# Patient Record
Sex: FEMALE | Race: BLACK OR AFRICAN AMERICAN | NOT HISPANIC OR LATINO
[De-identification: names, ages, dates, MRNs, and addresses within clinical notes are randomized per-mention and may not be internally consistent; named-entity substitution may affect disease eponyms.]

---

## 2017-06-21 ENCOUNTER — APPOINTMENT (OUTPATIENT)
Dept: ORTHOPEDIC SURGERY | Facility: CLINIC | Age: 52
End: 2017-06-21

## 2017-06-21 VITALS — BODY MASS INDEX: 39.37 KG/M2 | HEIGHT: 66 IN | WEIGHT: 245 LBS

## 2017-06-21 DIAGNOSIS — M48.07 SPINAL STENOSIS, LUMBOSACRAL REGION: ICD-10-CM

## 2017-06-21 DIAGNOSIS — M54.16 RADICULOPATHY, LUMBAR REGION: ICD-10-CM

## 2017-06-21 DIAGNOSIS — M43.16 SPONDYLOLISTHESIS, LUMBAR REGION: ICD-10-CM

## 2023-02-27 ENCOUNTER — EMERGENCY (EMERGENCY)
Facility: HOSPITAL | Age: 58
LOS: 1 days | Discharge: LEFT BEFORE DISCHARGE | End: 2023-02-27
Attending: EMERGENCY MEDICINE | Admitting: EMERGENCY MEDICINE
Payer: MEDICAID

## 2023-02-27 VITALS
DIASTOLIC BLOOD PRESSURE: 80 MMHG | SYSTOLIC BLOOD PRESSURE: 177 MMHG | TEMPERATURE: 99 F | OXYGEN SATURATION: 99 % | RESPIRATION RATE: 18 BRPM | HEART RATE: 72 BPM

## 2023-02-27 PROCEDURE — 71046 X-RAY EXAM CHEST 2 VIEWS: CPT | Mod: 26

## 2023-02-27 PROCEDURE — 99284 EMERGENCY DEPT VISIT MOD MDM: CPT | Mod: GC

## 2023-02-27 RX ORDER — ACETAMINOPHEN 500 MG
975 TABLET ORAL ONCE
Refills: 0 | Status: COMPLETED | OUTPATIENT
Start: 2023-02-27 | End: 2023-02-27

## 2023-02-27 RX ORDER — FAMOTIDINE 10 MG/ML
20 INJECTION INTRAVENOUS ONCE
Refills: 0 | Status: COMPLETED | OUTPATIENT
Start: 2023-02-27 | End: 2023-02-27

## 2023-02-27 RX ORDER — CYCLOBENZAPRINE HYDROCHLORIDE 10 MG/1
10 TABLET, FILM COATED ORAL ONCE
Refills: 0 | Status: COMPLETED | OUTPATIENT
Start: 2023-02-27 | End: 2023-02-27

## 2023-02-27 RX ORDER — CYCLOBENZAPRINE HYDROCHLORIDE 10 MG/1
5 TABLET, FILM COATED ORAL ONCE
Refills: 0 | Status: DISCONTINUED | OUTPATIENT
Start: 2023-02-27 | End: 2023-02-27

## 2023-02-27 NOTE — ED ADULT TRIAGE NOTE - CHIEF COMPLAINT QUOTE
Pt c/o L sided neck pain, L sided chest pain, sudden onset 2 hours ago. Also endorsing nausea and vomiting prior to arrival. Took 2 ASA at home. PMHx HTN

## 2023-02-27 NOTE — ED PROVIDER NOTE - NSFOLLOWUPCLINICS_GEN_ALL_ED_FT
[facility]  Cardiology  [address] [place] [id number]  Phone: [phone]  Fax:     [facility]  Cardiology  [address] [facility], [address]  [place] [address]  Phone: [phone]  Fax:

## 2023-02-27 NOTE — ED PROVIDER NOTE - CLINICAL SUMMARY MEDICAL DECISION MAKING FREE TEXT BOX
58-year-old female presenting to the ED with chest pain and neck pain started a few hours ago while sitting on the couch, pleuritic, worse with movement, did have 1 episode of nausea with nonbloody vomiting earlier, has not felt nauseous since then though has been belching, no other symptoms or concerns. On exam patient is well-appearing, unlabored breathing, neck supple with no swelling tenderness or limits range of motion, does have reproducible tenderness to left trapezius muscular region as well as left upper chest region, no crepitus, has equal chest rise, lungs are clear, abdomen is soft and nontender, no edema, extremities are warm and well-perfused with equal pulses bilaterally.  Patient has not had cardiac work-up in the past,.  Trope okay heart score is low, Wells score is low, will screen with dimer for PE given low risk profile. Plan for labs, chest x-ray, EKG, telemetry monitoring, already took aspirin, will administer additional symptomatic treatment, evaluate for differential including but not limited to electrolyte disturbances, organ dysfunction, ACS, pneumothorax, possibly GI versus MSK etiologies, CT imaging not indicated at this time given benign abdominal exam, not consistent with acute abdominal process. reassess pending initial work-up. Discussed possibility of CDU for further work-up including stress and echo as well as cardiology evaluation however patient would like to start with this and see what shows up before she decides how to proceed.

## 2023-02-27 NOTE — ED PROVIDER NOTE - PATIENT PORTAL LINK FT
You can access the FollowMyHealth Patient Portal offered by [facility] by registering at the following website: [email or web address]. By joining [employer]’s FollowMyHealth portal, you will also be able to view your health information using other applications (apps) compatible with our system.

## 2023-02-27 NOTE — ED PROVIDER NOTE - OBJECTIVE STATEMENT
58-year-old female presenting to the ED with chest pain and neck pain.  Started approximately 2 to 3 hours ago, Pain is on the left side of the chest and the left side of the neck, does not radiate anywhere else, is constant, worse with movement and worse with deep breathing.  Has never had it before.  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 Also reports one episode of nbnb emesis earlier after which she was belching frequently, no longer nauseated. No history of tobacco use, no fevers, no chills, no cough, no stiffness to the neck, no shortness of breath, no abdominal pain, no diarrhea, no dysuria, no edema, no recent travel, no recent surgeries.  Pain has been ongoing for approximately 2 to 3 hours without any major change.  She took 4 aspirin prior to arrival and her pain has now decreased slightly. No family hx of cardiac or thromboembolic dx.

## 2023-02-27 NOTE — ED PROVIDER NOTE - PROGRESS NOTE DETAILS
Gage: Patient reevaluated pain improved asking to go home.  I explained to the patient that although her first troponin is negative she arrived to the ER within 2 to 3 hours of her pain beginning and could be a false negative requiring repeat.  The rest of her results are significant for a chest x-ray that shows possible pulmonary venous congestion which again we would prefer to keep her in the CDU for further evaluation.  She was given the opportunity to stay for repeat troponin at a minimum, possible CDU but would prefer to go home.  She states she feels okay she understands the risk of possible missed myocardial injury, and agrees to come back to the ER if her symptoms worsen or change. She has been given a copy of her results. She agrees to follow-up with a cardiologist I will give her rapid referral.  She will be signing herself out AGAINST MEDICAL ADVICE and demonstrates adequate decision-making ability and capacity to do so.

## 2023-02-27 NOTE — ED PROVIDER NOTE - NSICDXPASTMEDICALHX_GEN_ALL_CORE_FT
PAST MEDICAL HISTORY:  Dyslipidemia off medication since 2014    Hypertension     Lumbar spinal stenosis

## 2023-02-27 NOTE — ED PROVIDER NOTE - PHYSICAL EXAMINATION
GEN - NAD; well appearing; A+O x3   HEAD - NC/AT   EYES- PERRL, EOMI  ENT: Airway patent, mmm, Oral cavity and pharynx normal. No inflammation, swelling, exudate, or lesions.    NECK: Neck supple, no swelling/ttp/skin change, from  PULMONARY - CTA b/l, symmetric breath sounds, unlabored.   CARDIAC -s1s2, RRR, no M,G,R, +reproducible cp to left side chest, left shoulder, no crepitus, +equal chest rise  ABDOMEN - +BS, ND, NT, soft, no guarding, no rebound, no masses   BACK - no CVA tenderness, Normal  spine, +TTP to left trapezius muscle region, no skin change  EXTREMITIES - FROM, symmetric pulses, capillary refill < 2 seconds, no edema   SKIN - no rash or bruising   NEUROLOGIC - alert, speech clear, no focal deficits  PSYCH -nl mood/affect, nl insight.

## 2023-02-27 NOTE — ED PROVIDER NOTE - NSFOLLOWUPINSTRUCTIONS_ED_ALL_ED_FT
You came to the ER with chest pain. We evaluated you and did not find an exact cause, however we believe you are stable for discharge at this time. Your results are attached below.     You can take TYLENOL/ACETAMINOPHEN up to 4,000mg a day for your symptoms in four divided doses.     You can take MOTRIN/IBUPROFEN up to 2,400mg a day in four divided doses (for up to 5 days with food).     Please return to the ER for worsening pain, passing out, palpitations, high or low heart rate, new fevers, difficulty breathing, or for any concern you would like evaluated. You came to the ER with chest pain. We evaluated you and did not find an exact cause, but you are choosing to leave prior to your evaluation being complete. Your results are attached below and note a chest Xray with abnormal findings requiring follow up.    Please see a CARDIOLOGIST AS SOON AS POSSIBLE    You can take TYLENOL/ACETAMINOPHEN up to 4,000mg a day for your symptoms in four divided doses.     You can take MOTRIN/IBUPROFEN up to 2,400mg a day in four divided doses (for up to 5 days with food).     Please return to the ER for worsening pain, passing out, palpitations, high or low heart rate, new fevers, difficulty breathing, or for any concern you would like evaluated.

## 2023-02-28 ENCOUNTER — EMERGENCY (EMERGENCY)
Facility: HOSPITAL | Age: 58
LOS: 1 days | Discharge: HOME | End: 2023-02-28
Attending: EMERGENCY MEDICINE | Admitting: STUDENT IN AN ORGANIZED HEALTH CARE EDUCATION/TRAINING PROGRAM
Payer: MEDICAID

## 2023-02-28 VITALS
SYSTOLIC BLOOD PRESSURE: 121 MMHG | OXYGEN SATURATION: 100 % | DIASTOLIC BLOOD PRESSURE: 60 MMHG | RESPIRATION RATE: 18 BRPM | HEART RATE: 52 BPM | TEMPERATURE: 98 F

## 2023-02-28 VITALS
SYSTOLIC BLOOD PRESSURE: 147 MMHG | RESPIRATION RATE: 18 BRPM | TEMPERATURE: 98 F | HEART RATE: 70 BPM | DIASTOLIC BLOOD PRESSURE: 84 MMHG | OXYGEN SATURATION: 99 %

## 2023-02-28 LAB
ALBUMIN SERPL ELPH-MCNC: 4.1 G/DL — SIGNIFICANT CHANGE UP (ref 3.3–5)
ALBUMIN SERPL ELPH-MCNC: 4.3 G/DL — SIGNIFICANT CHANGE UP (ref 3.3–5)
ALP SERPL-CCNC: 69 U/L — SIGNIFICANT CHANGE UP (ref 40–120)
ALP SERPL-CCNC: 72 U/L — SIGNIFICANT CHANGE UP (ref 40–120)
ALT FLD-CCNC: 11 U/L — SIGNIFICANT CHANGE UP (ref 4–33)
ALT FLD-CCNC: 13 U/L — SIGNIFICANT CHANGE UP (ref 4–33)
ANION GAP SERPL CALC-SCNC: 12 MMOL/L — SIGNIFICANT CHANGE UP (ref 7–14)
ANION GAP SERPL CALC-SCNC: 9 MMOL/L — SIGNIFICANT CHANGE UP (ref 7–14)
AST SERPL-CCNC: 15 U/L — SIGNIFICANT CHANGE UP (ref 4–32)
AST SERPL-CCNC: 20 U/L — SIGNIFICANT CHANGE UP (ref 4–32)
BASOPHILS # BLD AUTO: 0.06 K/UL — SIGNIFICANT CHANGE UP (ref 0–0.2)
BASOPHILS # BLD AUTO: 0.06 K/UL — SIGNIFICANT CHANGE UP (ref 0–0.2)
BASOPHILS NFR BLD AUTO: 0.9 % — SIGNIFICANT CHANGE UP (ref 0–2)
BASOPHILS NFR BLD AUTO: 1.1 % — SIGNIFICANT CHANGE UP (ref 0–2)
BILIRUB SERPL-MCNC: 0.2 MG/DL — SIGNIFICANT CHANGE UP (ref 0.2–1.2)
BILIRUB SERPL-MCNC: 0.4 MG/DL — SIGNIFICANT CHANGE UP (ref 0.2–1.2)
BUN SERPL-MCNC: 11 MG/DL — SIGNIFICANT CHANGE UP (ref 7–23)
BUN SERPL-MCNC: 13 MG/DL — SIGNIFICANT CHANGE UP (ref 7–23)
CALCIUM SERPL-MCNC: 9.4 MG/DL — SIGNIFICANT CHANGE UP (ref 8.4–10.5)
CALCIUM SERPL-MCNC: 9.8 MG/DL — SIGNIFICANT CHANGE UP (ref 8.4–10.5)
CHLORIDE SERPL-SCNC: 102 MMOL/L — SIGNIFICANT CHANGE UP (ref 98–107)
CHLORIDE SERPL-SCNC: 102 MMOL/L — SIGNIFICANT CHANGE UP (ref 98–107)
CO2 SERPL-SCNC: 24 MMOL/L — SIGNIFICANT CHANGE UP (ref 22–31)
CO2 SERPL-SCNC: 27 MMOL/L — SIGNIFICANT CHANGE UP (ref 22–31)
CREAT SERPL-MCNC: 0.98 MG/DL — SIGNIFICANT CHANGE UP (ref 0.5–1.3)
CREAT SERPL-MCNC: 1.13 MG/DL — SIGNIFICANT CHANGE UP (ref 0.5–1.3)
D DIMER BLD IA.RAPID-MCNC: <150 NG/ML DDU — SIGNIFICANT CHANGE UP
EGFR: 56 ML/MIN/1.73M2 — LOW
EGFR: 67 ML/MIN/1.73M2 — SIGNIFICANT CHANGE UP
EOSINOPHIL # BLD AUTO: 0.17 K/UL — SIGNIFICANT CHANGE UP (ref 0–0.5)
EOSINOPHIL # BLD AUTO: 0.26 K/UL — SIGNIFICANT CHANGE UP (ref 0–0.5)
EOSINOPHIL NFR BLD AUTO: 2.7 % — SIGNIFICANT CHANGE UP (ref 0–6)
EOSINOPHIL NFR BLD AUTO: 4.6 % — SIGNIFICANT CHANGE UP (ref 0–6)
FLUAV AG NPH QL: SIGNIFICANT CHANGE UP
FLUBV AG NPH QL: SIGNIFICANT CHANGE UP
GLUCOSE SERPL-MCNC: 103 MG/DL — HIGH (ref 70–99)
GLUCOSE SERPL-MCNC: 111 MG/DL — HIGH (ref 70–99)
HCT VFR BLD CALC: 36.2 % — SIGNIFICANT CHANGE UP (ref 34.5–45)
HCT VFR BLD CALC: 36.9 % — SIGNIFICANT CHANGE UP (ref 34.5–45)
HGB BLD-MCNC: 11.1 G/DL — LOW (ref 11.5–15.5)
HGB BLD-MCNC: 11.2 G/DL — LOW (ref 11.5–15.5)
IANC: 1.67 K/UL — LOW (ref 1.8–7.4)
IANC: 2.56 K/UL — SIGNIFICANT CHANGE UP (ref 1.8–7.4)
IMM GRANULOCYTES NFR BLD AUTO: 0.2 % — SIGNIFICANT CHANGE UP (ref 0–0.9)
LYMPHOCYTES # BLD AUTO: 2.05 K/UL — SIGNIFICANT CHANGE UP (ref 1–3.3)
LYMPHOCYTES # BLD AUTO: 3 K/UL — SIGNIFICANT CHANGE UP (ref 1–3.3)
LYMPHOCYTES # BLD AUTO: 33.3 % — SIGNIFICANT CHANGE UP (ref 13–44)
LYMPHOCYTES # BLD AUTO: 53.5 % — HIGH (ref 13–44)
MCHC RBC-ENTMCNC: 21.3 PG — LOW (ref 27–34)
MCHC RBC-ENTMCNC: 21.9 PG — LOW (ref 27–34)
MCHC RBC-ENTMCNC: 30.4 GM/DL — LOW (ref 32–36)
MCHC RBC-ENTMCNC: 30.7 GM/DL — LOW (ref 32–36)
MCV RBC AUTO: 70.3 FL — LOW (ref 80–100)
MCV RBC AUTO: 71.5 FL — LOW (ref 80–100)
MONOCYTES # BLD AUTO: 0.39 K/UL — SIGNIFICANT CHANGE UP (ref 0–0.9)
MONOCYTES # BLD AUTO: 0.61 K/UL — SIGNIFICANT CHANGE UP (ref 0–0.9)
MONOCYTES NFR BLD AUTO: 10.9 % — SIGNIFICANT CHANGE UP (ref 2–14)
MONOCYTES NFR BLD AUTO: 6.3 % — SIGNIFICANT CHANGE UP (ref 2–14)
NEUTROPHILS # BLD AUTO: 1.67 K/UL — LOW (ref 1.8–7.4)
NEUTROPHILS # BLD AUTO: 3.17 K/UL — SIGNIFICANT CHANGE UP (ref 1.8–7.4)
NEUTROPHILS NFR BLD AUTO: 29.7 % — LOW (ref 43–77)
NEUTROPHILS NFR BLD AUTO: 51.4 % — SIGNIFICANT CHANGE UP (ref 43–77)
NRBC # BLD: 0 /100 WBCS — SIGNIFICANT CHANGE UP (ref 0–0)
NRBC # FLD: 0 K/UL — SIGNIFICANT CHANGE UP (ref 0–0)
PLATELET # BLD AUTO: 291 K/UL — SIGNIFICANT CHANGE UP (ref 150–400)
PLATELET # BLD AUTO: 307 K/UL — SIGNIFICANT CHANGE UP (ref 150–400)
POTASSIUM SERPL-MCNC: 3.8 MMOL/L — SIGNIFICANT CHANGE UP (ref 3.5–5.3)
POTASSIUM SERPL-MCNC: 4 MMOL/L — SIGNIFICANT CHANGE UP (ref 3.5–5.3)
POTASSIUM SERPL-SCNC: 3.8 MMOL/L — SIGNIFICANT CHANGE UP (ref 3.5–5.3)
POTASSIUM SERPL-SCNC: 4 MMOL/L — SIGNIFICANT CHANGE UP (ref 3.5–5.3)
PROT SERPL-MCNC: 7.9 G/DL — SIGNIFICANT CHANGE UP (ref 6–8.3)
PROT SERPL-MCNC: 8.4 G/DL — HIGH (ref 6–8.3)
RBC # BLD: 5.06 M/UL — SIGNIFICANT CHANGE UP (ref 3.8–5.2)
RBC # BLD: 5.25 M/UL — HIGH (ref 3.8–5.2)
RBC # FLD: 14.7 % — HIGH (ref 10.3–14.5)
RBC # FLD: 14.8 % — HIGH (ref 10.3–14.5)
RSV RNA NPH QL NAA+NON-PROBE: SIGNIFICANT CHANGE UP
SARS-COV-2 RNA SPEC QL NAA+PROBE: SIGNIFICANT CHANGE UP
SODIUM SERPL-SCNC: 138 MMOL/L — SIGNIFICANT CHANGE UP (ref 135–145)
SODIUM SERPL-SCNC: 138 MMOL/L — SIGNIFICANT CHANGE UP (ref 135–145)
TROPONIN T, HIGH SENSITIVITY RESULT: 6 NG/L — SIGNIFICANT CHANGE UP
TROPONIN T, HIGH SENSITIVITY RESULT: 6 NG/L — SIGNIFICANT CHANGE UP
TROPONIN T, HIGH SENSITIVITY RESULT: 7 NG/L — SIGNIFICANT CHANGE UP
TSH SERPL-MCNC: 1.69 UIU/ML — SIGNIFICANT CHANGE UP (ref 0.27–4.2)
WBC # BLD: 5.61 K/UL — SIGNIFICANT CHANGE UP (ref 3.8–10.5)
WBC # BLD: 6.17 K/UL — SIGNIFICANT CHANGE UP (ref 3.8–10.5)
WBC # FLD AUTO: 5.61 K/UL — SIGNIFICANT CHANGE UP (ref 3.8–10.5)
WBC # FLD AUTO: 6.17 K/UL — SIGNIFICANT CHANGE UP (ref 3.8–10.5)

## 2023-02-28 PROCEDURE — 99223 1ST HOSP IP/OBS HIGH 75: CPT

## 2023-02-28 RX ORDER — KETOROLAC TROMETHAMINE 30 MG/ML
15 SYRINGE (ML) INJECTION ONCE
Refills: 0 | Status: DISCONTINUED | OUTPATIENT
Start: 2023-02-28 | End: 2023-02-28

## 2023-02-28 RX ORDER — DIAZEPAM 5 MG
5 TABLET ORAL ONCE
Refills: 0 | Status: DISCONTINUED | OUTPATIENT
Start: 2023-02-28 | End: 2023-02-28

## 2023-02-28 RX ORDER — LIDOCAINE 4 G/100G
1 CREAM TOPICAL ONCE
Refills: 0 | Status: COMPLETED | OUTPATIENT
Start: 2023-02-28 | End: 2023-02-28

## 2023-02-28 RX ORDER — ACETAMINOPHEN 500 MG
975 TABLET ORAL ONCE
Refills: 0 | Status: COMPLETED | OUTPATIENT
Start: 2023-02-28 | End: 2023-02-28

## 2023-02-28 RX ORDER — TRIAMTERENE/HYDROCHLOROTHIAZID 75 MG-50MG
1 TABLET ORAL DAILY
Refills: 0 | Status: DISCONTINUED | OUTPATIENT
Start: 2023-03-01 | End: 2023-03-04

## 2023-02-28 RX ORDER — LOSARTAN POTASSIUM 100 MG/1
50 TABLET, FILM COATED ORAL DAILY
Refills: 0 | Status: DISCONTINUED | OUTPATIENT
Start: 2023-03-01 | End: 2023-03-04

## 2023-02-28 RX ADMIN — Medication 15 MILLIGRAM(S): at 18:06

## 2023-02-28 RX ADMIN — LIDOCAINE 1 PATCH: 4 CREAM TOPICAL at 15:33

## 2023-02-28 RX ADMIN — Medication 975 MILLIGRAM(S): at 00:16

## 2023-02-28 RX ADMIN — Medication 975 MILLIGRAM(S): at 18:06

## 2023-02-28 RX ADMIN — Medication 5 MILLIGRAM(S): at 15:33

## 2023-02-28 RX ADMIN — Medication 15 MILLIGRAM(S): at 18:36

## 2023-02-28 RX ADMIN — FAMOTIDINE 20 MILLIGRAM(S): 10 INJECTION INTRAVENOUS at 00:17

## 2023-02-28 RX ADMIN — CYCLOBENZAPRINE HYDROCHLORIDE 10 MILLIGRAM(S): 10 TABLET, FILM COATED ORAL at 00:17

## 2023-02-28 RX ADMIN — Medication 975 MILLIGRAM(S): at 18:36

## 2023-02-28 NOTE — ED CDU PROVIDER INITIAL DAY NOTE - CLINICAL SUMMARY MEDICAL DECISION MAKING FREE TEXT BOX
58-year-old PMH HLD (not on medication), HTN, Lumbar stenosis PSH Unilateral salpingectomy C/O L sided CP. Pt was seen yesterday when the pain started, states it began while watching TV, states she still had L sided pain and presented to the ED. Pain is worse with movement and worse with deep breathing.  Has never had it before. 58-year-old PMH HLD (not on medication), HTN, Lumbar stenosis PSH Unilateral salpingectomy C/O L sided CP. Pt was seen yesterday when the pain started, states it began while watching TV, states she still had L sided pain and presented to the ED. Pain is worse with movement and worse with deep breathing.  Has never had it before. In the ED pt had a neg D-Dimer, trop is stable, sinus bradycardia on telemetry, plan is continued telemetry monitoring, AM echocardiogram and stress testing.

## 2023-02-28 NOTE — ED ADULT NURSE NOTE - OBJECTIVE STATEMENT
Pt received to intake,  awake and alert, A&OX4, ambulatory. C/o L sided chest pain x1 day. Reporting main under breast and to upper L chest. States she was seen last night be left AMA. Respirations even and unlabored. Resting comfortably. Denies CP, SOB, N/V, HA, dizziness, palpitations, fatigue. 20G IV placed to  L AC. Moved to HW and placed on CM. NAD.

## 2023-02-28 NOTE — ED ADULT NURSE NOTE - NSIMPLEMENTINTERV_GEN_ALL_ED
Implemented All Universal Safety Interventions:  [place] to call system. Call bell, personal items and telephone within reach. Instruct patient to call for assistance. Room bathroom lighting operational. Non-slip footwear when patient is off stretcher. Physically safe environment: no spills, clutter or unnecessary equipment. Stretcher in lowest position, wheels locked, appropriate side rails in place.

## 2023-02-28 NOTE — ED PROVIDER NOTE - CLINICAL SUMMARY MEDICAL DECISION MAKING FREE TEXT BOX
Well-appearing female presents as a bounce back to the ED with persistent chest pain radiating from the neck to inframammary region and substernally.  No anginal equivalents, and was ruled out for PE via D-dimer overnight.  Patient has reproducible pain along the left sternocleidomastoid, possibly musculoskeletal in origin will prescribe Valium, in addition to continued ACS work-up from overnight.  No acute changes from prior ECG.  Will encourage patient to stay in the CDU for stress/echo, as encouraged yesterday.

## 2023-02-28 NOTE — ED CDU PROVIDER INITIAL DAY NOTE - ATTENDING APP SHARED VISIT CONTRIBUTION OF CARE
I have evaluated the patient and agree with the documentation and assessment as made by the PA. We have discussed plan of care and work up for the patient.    Exam:    GEN:  Non-toxic appearing, non-distressed, speaking full sentences, non-diaphoretic, AAOx3  HEENT:  NCAT, neck supple, EOMI, PERRLA, sclera anicteric, no conjunctival pallor or injection, no stridor, normal voice, no tonsillar exudate, uvula midline  CV:  regular rhythm and rate, s1/s2 audible, no murmurs, rubs or gallops, peripheral pulses 2+ and symmetric  PULM:  non-labored respirations, lungs clear to auscultation bilaterally, no wheezes, crackles or rales  ABD:  non distended, non-tender, no rebound, no guarding, negative Pires's sign, bowel sounds normal, no cvat  MSK:  no gross deformity, non-tender extremities and joints, range of motion grossly normal appearing, no extremity edema, extremities warm and well perfused   NEURO:  AAOx3, CN II-XII intact, motor 5/5 in upper and lower extremities bilaterally, sensation grossly intact in extremities and trunk, finger to nose testing wnl, no nystagmus, negative Romberg, no pronator drift, no gait deficit  SKIN:  warm, dry, no rash or vesicles

## 2023-02-28 NOTE — ED PROVIDER NOTE - PROGRESS NOTE DETAILS
CARY Tolliver PGY2 Pain refractory to Valium, Toradol ordered. trop stable from last visit. will repeat in the setting of persistent pain. CARY Tolliver PGY2 Pain markedly improved after Toradol. Patient agreeable to CDU stay as advised yesterday.

## 2023-02-28 NOTE — ED ADULT NURSE REASSESSMENT NOTE - NS ED NURSE REASSESS COMMENT FT1
Report received from KELSEY Tom. Pt A&Ox4, resting in stretcher, RR even and unlabored, pt in NAD. Pt leaving AMA, paperwork signed and in patient chart, pt educated on risks of leaving AMA. IV access removed by resident.

## 2023-02-28 NOTE — ED CDU PROVIDER INITIAL DAY NOTE - NS ED ATTENDING STATEMENT MOD
This was a shared visit with the JOSSUE. I reviewed and verified the documentation and independently performed the documented:

## 2023-02-28 NOTE — ED ADULT NURSE NOTE - NSIMPLEMENTINTERV_GEN_ALL_ED
Implemented All Universal Safety Interventions:  [place] to call system. Call bell, personal items and telephone within reach. Instruct patient to call for assistance. Room bathroom lighting operational. Non-slip footwear when patient is off stretcher. Physically safe environment: no spills, clutter or unnecessary equipment. Stretcher in lowest position, wheels locked, appropriate side rails in place.

## 2023-02-28 NOTE — ED PROVIDER NOTE - PHYSICAL EXAMINATION
General: non-toxic, NAD  HEENT: NCAT, PERRL, oropharyngeal AW patent, no carotid bruits  Cardiac: Bradycardic, no murmurs, 2+ peripheral pulses  Resp: CTAB, normal rate  Abdomen: soft, non-distended, bowel sounds present, no ttp, no rebound or guarding. no organomegaly  MSK: No chest wall tenderness, crepitance.  No midline spinal tenderness.  Tender to palpation along the left sternocleidomastoid.  No peripheral edema, calf tenderness, or leg size discrepancies  Skin: warm and dry no rashes  Neuro: AAOx4, 5+motor, sensation grossly intact  Psych: mood and affect appropriate

## 2023-02-28 NOTE — ED CDU PROVIDER INITIAL DAY NOTE - OBJECTIVE STATEMENT
58-year-old PMH HLD (not on medication), HTN, Lumbar stenosis PSH Unilateral salpingectomy C/O L sided CP. Pt was seen yesterday when the pain started, states it began while watching TV, states she still had L sided pain and presented to the ED. Pain is worse with movement and worse with deep breathing.  Has never had it before.  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  No history of tobacco use, no fevers, no chills, no cough, no stiffness to the neck, no shortness of breath, no abdominal pain, no diarrhea, no dysuria, no edema, no recent travel, no recent surgeries.  No family hx of cardiac or thromboembolic dx. Pt states that she currently feels well, states she has not yet had a stress test or echocardiogram.

## 2023-02-28 NOTE — ED PROVIDER NOTE - OBJECTIVE STATEMENT
58-year-old female presents after ED visit yesterday for neck pain radiating to the chest that has been unrelenting.  States that the pain is not more severe, however is just persisting.  There is no associated shortness of breath, diaphoresis, nausea, vomiting.  No recent chiropractic manipulation or numbness tingling weakness down the arms.  States she was offered to stay at her last visit, however was worried about taking time off from her job, which is new.  No infectious symptoms or pleurisy.  No hormonal medications, lower extremity swelling, recent flight/surgery/immobilization.

## 2023-02-28 NOTE — ED ADULT NURSE NOTE - NSICDXPASTSURGICALHX_GEN_ALL_CORE_FT
PAST SURGICAL HISTORY:  H/O unilateral salpingectomy     H/O:  section      Patient baseline mental status

## 2023-02-28 NOTE — ED PROVIDER NOTE - ATTENDING CONTRIBUTION TO CARE
Brief HPI:  58-year-old PMH HLD (not on medication), HTN, Lumbar stenosis PSH Unilateral salpingectomy C/O L sided CP starting two days ago.  Patient seen in ED one day prior to ED presentation for similar sx, had neg workup including d-dimer.  She was recommended to stay in hospital for further testing however she left AMA.  Patient returns to ED for persistent sx.  Non-exertional, non-pleuritic, radiating to neck.  Denies othr sx.      Vitals:   Reviewed    Exam:    GEN:  Non-toxic appearing, non-distressed, speaking full sentences, non-diaphoretic, AAOx3  HEENT:  NCAT, neck supple, EOMI, PERRLA, sclera anicteric, no conjunctival pallor or injection, no stridor, normal voice, no tonsillar exudate, uvula midline  CV:  regular rhythm and rate, s1/s2 audible, no murmurs, rubs or gallops, peripheral pulses 2+ and symmetric  PULM:  non-labored respirations, lungs clear to auscultation bilaterally, no wheezes, crackles or rales  ABD:  non distended, non-tender, no rebound, no guarding, negative Pires's sign, bowel sounds normal, no cvat  MSK:  no gross deformity, non-tender extremities and joints, range of motion grossly normal appearing, no extremity edema, extremities warm and well perfused   NEURO:  AAOx3, CN II-XII intact, motor 5/5 in upper and lower extremities bilaterally, sensation grossly intact in extremities and trunk, finger to nose testing wnl, no nystagmus, negative Romberg, no pronator drift, no gait deficit  SKIN:  warm, dry, no rash or vesicles     A/P:  58-year-old PMH HLD (not on medication), HTN, Lumbar stenosis PSH Unilateral salpingectomy C/O L sided CP starting two days ago.  VSS.  EKG sinus bradycardia without ischemic change.  Low c/f acs given non-exertional, however pain does radiate.  Low c/f pe given neg dimer recently.  Low c/f aortic dissection as no radiation to back, no pulse or neuro deficit on exam.  Plan for labs, supportive care.  Possible cdu for stress test and echo.

## 2023-02-28 NOTE — ED ADULT TRIAGE NOTE - CHIEF COMPLAINT QUOTE
Pt returns to ED after AMA yesterday for persistent CP. Pt was advised to stay yesterday for repeat blood work but signed out AMA. Pt returns today for similar symptoms. PMH HTN

## 2023-02-28 NOTE — ED ADULT NURSE NOTE - OBJECTIVE STATEMENT
58 yof presents A&Ox4, c/o L sided neck pain radiating to L side of chest. States pain started about 3 hrs prior to ED arrival while sitting down. Denies any associated diaphoresis, sob, or dizziness, long travels or leg swelling. PMHx htn. Respirations even and unlabored, normal sinus on cardiac monitor, sating 100% on RA. pt denies any nausea, vomiting, diarrhea, recent falls , fever or travel. No acute distress noted. 20g IV placed in R AC. Labs sent per order. Pending lab results. bed in lowest position, side rails up, call bell in hand, safety maintained.

## 2023-03-01 ENCOUNTER — APPOINTMENT (OUTPATIENT)
Dept: CARDIOLOGY | Facility: CLINIC | Age: 58
End: 2023-03-01

## 2023-03-01 VITALS
SYSTOLIC BLOOD PRESSURE: 133 MMHG | TEMPERATURE: 98 F | OXYGEN SATURATION: 100 % | HEART RATE: 54 BPM | RESPIRATION RATE: 18 BRPM | DIASTOLIC BLOOD PRESSURE: 60 MMHG

## 2023-03-01 LAB
FLUAV AG NPH QL: SIGNIFICANT CHANGE UP
FLUBV AG NPH QL: SIGNIFICANT CHANGE UP
RSV RNA NPH QL NAA+NON-PROBE: SIGNIFICANT CHANGE UP
SARS-COV-2 RNA SPEC QL NAA+PROBE: SIGNIFICANT CHANGE UP

## 2023-03-01 PROCEDURE — 99238 HOSP IP/OBS DSCHRG MGMT 30/<: CPT

## 2023-03-01 PROCEDURE — 78452 HT MUSCLE IMAGE SPECT MULT: CPT | Mod: 26,MA

## 2023-03-01 PROCEDURE — 93306 TTE W/DOPPLER COMPLETE: CPT | Mod: 26

## 2023-03-01 PROCEDURE — 93016 CV STRESS TEST SUPVJ ONLY: CPT | Mod: GC

## 2023-03-01 PROCEDURE — 93018 CV STRESS TEST I&R ONLY: CPT | Mod: GC

## 2023-03-01 NOTE — ED CDU PROVIDER SUBSEQUENT DAY NOTE - CLINICAL SUMMARY MEDICAL DECISION MAKING FREE TEXT BOX
58-year-old PMH HLD (not on medication), HTN, Lumbar stenosis PSH Unilateral salpingectomy C/O L sided CP. Pt was seen yesterday when the pain started, states it began while watching TV, states she still had L sided pain and presented to the ED. Pain is worse with movement and worse with deep breathing.  Has never had it before. In the ED pt had a neg D-Dimer, trop is stable, sinus bradycardia on telemetry, plan is continued telemetry monitoring, AM echocardiogram and stress testing. Pt has been stable while in CDU.

## 2023-03-01 NOTE — ED CDU PROVIDER SUBSEQUENT DAY NOTE - HISTORY
58-year-old PMH HLD (not on medication), HTN, Lumbar stenosis PSH Unilateral salpingectomy C/O L sided CP. Pt was seen yesterday when the pain started, states it began while watching TV, states she still had L sided pain and presented to the ED. Pain is worse with movement and worse with deep breathing.  Has never had it before.  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  No history of tobacco use, no fevers, no chills, no cough, no stiffness to the neck, no shortness of breath, no abdominal pain, no diarrhea, no dysuria, no edema, no recent travel, no recent surgeries.  No family hx of cardiac or thromboembolic dx. Pt states that she currently feels well, states she has not yet had a stress test or echocardiogram. Pt has been stable while in CDU, currently awaiting an AM stress test and echocardiogram.

## 2023-03-01 NOTE — ED CDU PROVIDER DISPOSITION NOTE - PATIENT PORTAL LINK FT
You can access the FollowMyHealth Patient Portal offered by [facility] by registering at the following website: [email or web address]. By joining [employer]’s FollowMyHealth portal, you will also be able to view your health information using other applications (apps) compatible with our system.

## 2023-03-01 NOTE — ED ADULT NURSE REASSESSMENT NOTE - NS ED NURSE REASSESS COMMENT FT1
Report given to CDU. Pt a&ox4, ambulatory. Pt denies cp, sob, n/v, headache, dizziness. Breathing even, unlabored. Pending stress test results.

## 2023-03-01 NOTE — ED CDU PROVIDER DISPOSITION NOTE - ATTENDING CONTRIBUTION TO CARE
58yoF PMH HLD, HTN, lumbar stenosis, p/w left sided chest pain, tightness, radiating to her L neck, worse w/ movement. intermittently pleuritic. no associated sob, nausea, diaphoresis. nonsmoker. no prior cardiac w/u's. recent ED visit for similar symptoms. while in ED, trop 6,7,6, dimer wnl, ECG NSR nonischemic, labs otherwise unremarkable, CXR revealing mildly increased interstitial markings (no fevers or cough). pt transferred to CDU for NST/ECHO. while in CDU, pt remains chest pain free, VSS, NST negative for ischemia, ECHO revealing normal EF, no RWMA. will dc with outpatient PCP and cardio f/u. Return precautions discussed. pt verbalized understanding and is in agreement with plan.

## 2023-03-01 NOTE — ED ADULT NURSE REASSESSMENT NOTE - NS ED NURSE REASSESS COMMENT FT1
Pt appears comfortably sleeping in stretcher. Respirations even and unlabored. No acute distress noted. Pt not placed in observation due to CDU fullness. Visitor at bedside. Bed in lowest position, wheels locked, safety maintained. Awaiting further orders. Pt appears comfortably sleeping in stretcher. Respirations even and unlabored. No acute distress noted. Pt not placed in observation due to CDU fullness. Sinus soumya on bedside cardiac monitor. Visitor at bedside. Bed in lowest position, wheels locked, safety maintained. Awaiting further orders.

## 2023-03-01 NOTE — ED CDU PROVIDER SUBSEQUENT DAY NOTE - ATTENDING APP SHARED VISIT CONTRIBUTION OF CARE
58-year-old PMH HLD (not on medication), HTN, Lumbar stenosis PSH Unilateral salpingectomy C/O L sided CP and neck pain. Pt was seen yesterday when the pain started, states it began while watching TV, states she still had L sided pain and presented to the ED. Pain is worse with movement and worse with deep breathing.  Has never had it before. In the ED pt had a neg D-Dimer, trop is stable, sinus bradycardia on telemetry, plan is continued telemetry monitoring, AM echocardiogram and stress testing. Pt has been stable while in CDU. pt states no recurrence of pain and tolerated echo/stress well, pending results

## 2023-03-01 NOTE — ED ADULT NURSE REASSESSMENT NOTE - NS ED NURSE REASSESS COMMENT FT1
Pt awake, appears comfortable sitting in stretcher. Denies CP, SOB, nausea, vomiting, headache, lightheadedness, dizziness, fever or chills. No acute distress noted. Pt speaking in full and complete sentences. Respirations even and unlabored. Range between NSR and sinus soumya on bedside cardiac monitor. Visitor at bedside. Bed in lowest position, wheels locked safety maintained. Awaiting further orders. Pt awake, appears comfortable sitting in stretcher. Denies CP, SOB, nausea, vomiting, headache, lightheadedness, dizziness, fever or chills. No acute distress noted. Pt speaking in full and complete sentences. Respirations even and unlabored. Sinus soumya on bedside cardiac monitor. Visitor at bedside. Bed in lowest position, wheels locked safety maintained. Awaiting further orders.

## 2023-03-01 NOTE — ED CDU PROVIDER DISPOSITION NOTE - CLINICAL COURSE
58yoF PMH HLD, HTN, lumbar stenosis, p/w left sided chest pain, tightness, radiating to her L neck, worse w/ movement. intermittently pleuritic. no asssocaited sob, nausea, diaphoresis. nonsmoker. no prior cardiac w/u's. recent ED visit for similar symptoms. while in ED, trop 6,7,6, ECG NSR nonischemic, labs otherwise unremarkalbe, CXR revealing mildly increased interstitial markings (no fevers or cough). pt transferred to CDU for NST/ECHO. while in CDU, pt remains chest pain free, VSS, NST negative for ischemia, ECHO revealing normal EF, no RWMA. will dc with outpatient PCP and cardio f/u. Return precautions discussed. pt verbalized understanding and is in agrement with plan. 58yoF PMH HLD, HTN, lumbar stenosis, p/w left sided chest pain, tightness, radiating to her L neck, worse w/ movement. intermittently pleuritic. no asssocaited sob, nausea, diaphoresis. nonsmoker. no prior cardiac w/u's. recent ED visit for similar symptoms. while in ED, trop 6,7,6, dimer wnl, ECG NSR nonischemic, labs otherwise unremarkalbe, CXR revealing mildly increased interstitial markings (no fevers or cough). pt transferred to CDU for NST/ECHO. while in CDU, pt remains chest pain free, VSS, NST negative for ischemia, ECHO revealing normal EF, no RWMA. will dc with outpatient PCP and cardio f/u. Return precautions discussed. pt verbalized understanding and is in agrement with plan.

## 2023-03-08 ENCOUNTER — APPOINTMENT (OUTPATIENT)
Dept: CARDIOLOGY | Facility: HOSPITAL | Age: 58
End: 2023-03-08

## 2023-03-08 NOTE — CARDIOLOGY SUMMARY
[de-identified] : 2/28: \par 138/3.8 | 102/24 | 13/1.13 < 111 Ca 9.4 \par 20/13 | 69 | 0.2 | 7.9/4.1 \par A1c 5.9 \par Hstrop 6; d-dimer < 150\par proBNP 17

## 2023-03-08 NOTE — HISTORY OF PRESENT ILLNESS
[FreeTextEntry1] : 58-year-old PMH HLD (not on medication), HTN, Lumbar stenosis (s/p L4, L5 laminectomy, interbody fusion), presents for initial evaluation.\par Recently seen in the ED for C/O L sided CP 2/28: pain occurred while watching TV, worse with movement and worse with deep breathing.  Has never had it before.  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  \par ROS: no fevers, no chills, no cough, no stiffness to the neck, no shortness of breath, no abdominal pain, no diarrhea, no dysuria, no edema, no\par recent travel, no recent surgeries.  \par \par Discharged from ED after benign blood work and EKG.\par \par PMHx: as above\par PSurghx: unilateral salpingectomy, s/p L4, 5 laminectomy and interbody fusion\par \par FHx:No family hx of cardiac or thromboembolic dx.\par Social history: no tobacco use\par \par Home Medications:\par * Patient Currently Takes Medications as of 08-Jun-2015 10:08 documented in\par Structured Notes\par - oxyCODONE 10 mg oral tablet: 1 tab(s) orally every 4 hours, As needed,\par Moderate Pain\par - oxyCODONE 5 mg oral tablet: 1 tab(s) orally every 4 hours, As needed, Mild\par Pain\par - docusate sodium 100 mg oral capsule: 1 cap(s) orally 3 times a day\par - gabapentin 100 mg oral capsule: 2 cap(s) orally every 8 hours\par - traMADol 50 mg oral tablet: 1 tab(s) orally every 8 hours\par - amLODIPine-benazepril 10 mg-40 mg oral capsule: 1 cap(s) orally once a day\par \par Allergies: NKDA

## 2024-03-09 ENCOUNTER — EMERGENCY (EMERGENCY)
Age: 59
LOS: 1 days | Discharge: HOME | End: 2024-03-09
Attending: EMERGENCY MEDICINE | Admitting: EMERGENCY MEDICINE
Payer: COMMERCIAL

## 2024-03-09 VITALS
RESPIRATION RATE: 18 BRPM | HEART RATE: 54 BPM | DIASTOLIC BLOOD PRESSURE: 82 MMHG | SYSTOLIC BLOOD PRESSURE: 165 MMHG | TEMPERATURE: 98 F | OXYGEN SATURATION: 98 %

## 2024-03-09 VITALS
DIASTOLIC BLOOD PRESSURE: 83 MMHG | WEIGHT: 260.92 LBS | SYSTOLIC BLOOD PRESSURE: 169 MMHG | RESPIRATION RATE: 18 BRPM | OXYGEN SATURATION: 99 % | HEART RATE: 60 BPM | TEMPERATURE: 98 F

## 2024-03-09 LAB
ALBUMIN SERPL ELPH-MCNC: 4.3 G/DL — SIGNIFICANT CHANGE UP (ref 3.3–5)
ALP SERPL-CCNC: 78 U/L — SIGNIFICANT CHANGE UP (ref 40–120)
ALT FLD-CCNC: 12 U/L — SIGNIFICANT CHANGE UP (ref 4–33)
ANION GAP SERPL CALC-SCNC: 12 MMOL/L — SIGNIFICANT CHANGE UP (ref 7–14)
APPEARANCE UR: CLEAR — SIGNIFICANT CHANGE UP
AST SERPL-CCNC: 19 U/L — SIGNIFICANT CHANGE UP (ref 4–32)
BACTERIA # UR AUTO: NEGATIVE /HPF — SIGNIFICANT CHANGE UP
BASOPHILS # BLD AUTO: 0 K/UL — SIGNIFICANT CHANGE UP (ref 0–0.2)
BASOPHILS NFR BLD AUTO: 0 % — SIGNIFICANT CHANGE UP (ref 0–2)
BILIRUB SERPL-MCNC: 0.5 MG/DL — SIGNIFICANT CHANGE UP (ref 0.2–1.2)
BILIRUB UR-MCNC: NEGATIVE — SIGNIFICANT CHANGE UP
BUN SERPL-MCNC: 10 MG/DL — SIGNIFICANT CHANGE UP (ref 7–23)
CALCIUM SERPL-MCNC: 9.3 MG/DL — SIGNIFICANT CHANGE UP (ref 8.4–10.5)
CAST: 0 /LPF — SIGNIFICANT CHANGE UP (ref 0–4)
CHLORIDE SERPL-SCNC: 104 MMOL/L — SIGNIFICANT CHANGE UP (ref 98–107)
CO2 SERPL-SCNC: 24 MMOL/L — SIGNIFICANT CHANGE UP (ref 22–31)
COLOR SPEC: YELLOW — SIGNIFICANT CHANGE UP
CREAT SERPL-MCNC: 0.84 MG/DL — SIGNIFICANT CHANGE UP (ref 0.5–1.3)
DIFF PNL FLD: NEGATIVE — SIGNIFICANT CHANGE UP
EGFR: 80 ML/MIN/1.73M2 — SIGNIFICANT CHANGE UP
EOSINOPHIL # BLD AUTO: 0.31 K/UL — SIGNIFICANT CHANGE UP (ref 0–0.5)
EOSINOPHIL NFR BLD AUTO: 3.6 % — SIGNIFICANT CHANGE UP (ref 0–6)
GLUCOSE SERPL-MCNC: 89 MG/DL — SIGNIFICANT CHANGE UP (ref 70–99)
GLUCOSE UR QL: NEGATIVE MG/DL — SIGNIFICANT CHANGE UP
HCT VFR BLD CALC: 39.8 % — SIGNIFICANT CHANGE UP (ref 34.5–45)
HGB BLD-MCNC: 12.2 G/DL — SIGNIFICANT CHANGE UP (ref 11.5–15.5)
IANC: 3.97 K/UL — SIGNIFICANT CHANGE UP (ref 1.8–7.4)
KETONES UR-MCNC: NEGATIVE MG/DL — SIGNIFICANT CHANGE UP
LEUKOCYTE ESTERASE UR-ACNC: NEGATIVE — SIGNIFICANT CHANGE UP
LIDOCAIN IGE QN: 23 U/L — SIGNIFICANT CHANGE UP (ref 7–60)
LYMPHOCYTES # BLD AUTO: 3.96 K/UL — HIGH (ref 1–3.3)
LYMPHOCYTES # BLD AUTO: 45.5 % — HIGH (ref 13–44)
MANUAL SMEAR VERIFICATION: SIGNIFICANT CHANGE UP
MCHC RBC-ENTMCNC: 22 PG — LOW (ref 27–34)
MCHC RBC-ENTMCNC: 30.7 GM/DL — LOW (ref 32–36)
MCV RBC AUTO: 71.7 FL — LOW (ref 80–100)
MONOCYTES # BLD AUTO: 0.39 K/UL — SIGNIFICANT CHANGE UP (ref 0–0.9)
MONOCYTES NFR BLD AUTO: 4.5 % — SIGNIFICANT CHANGE UP (ref 2–14)
NEUTROPHILS # BLD AUTO: 3.58 K/UL — SIGNIFICANT CHANGE UP (ref 1.8–7.4)
NEUTROPHILS NFR BLD AUTO: 41.1 % — LOW (ref 43–77)
NITRITE UR-MCNC: NEGATIVE — SIGNIFICANT CHANGE UP
OVALOCYTES BLD QL SMEAR: SLIGHT — SIGNIFICANT CHANGE UP
PH UR: 7 — SIGNIFICANT CHANGE UP (ref 5–8)
PLAT MORPH BLD: NORMAL — SIGNIFICANT CHANGE UP
PLATELET # BLD AUTO: 317 K/UL — SIGNIFICANT CHANGE UP (ref 150–400)
PLATELET COUNT - ESTIMATE: NORMAL — SIGNIFICANT CHANGE UP
POIKILOCYTOSIS BLD QL AUTO: SLIGHT — SIGNIFICANT CHANGE UP
POTASSIUM SERPL-MCNC: 3.8 MMOL/L — SIGNIFICANT CHANGE UP (ref 3.5–5.3)
POTASSIUM SERPL-SCNC: 3.8 MMOL/L — SIGNIFICANT CHANGE UP (ref 3.5–5.3)
PROT SERPL-MCNC: 8.4 G/DL — HIGH (ref 6–8.3)
PROT UR-MCNC: NEGATIVE MG/DL — SIGNIFICANT CHANGE UP
RBC # BLD: 5.55 M/UL — HIGH (ref 3.8–5.2)
RBC # FLD: 15.1 % — HIGH (ref 10.3–14.5)
RBC BLD AUTO: ABNORMAL
RBC CASTS # UR COMP ASSIST: 0 /HPF — SIGNIFICANT CHANGE UP (ref 0–4)
SMUDGE CELLS # BLD: PRESENT — SIGNIFICANT CHANGE UP
SODIUM SERPL-SCNC: 140 MMOL/L — SIGNIFICANT CHANGE UP (ref 135–145)
SP GR SPEC: 1.01 — SIGNIFICANT CHANGE UP (ref 1–1.03)
SQUAMOUS # UR AUTO: 0 /HPF — SIGNIFICANT CHANGE UP (ref 0–5)
UROBILINOGEN FLD QL: 0.2 MG/DL — SIGNIFICANT CHANGE UP (ref 0.2–1)
VARIANT LYMPHS # BLD: 5.3 % — SIGNIFICANT CHANGE UP (ref 0–6)
WBC # BLD: 8.7 K/UL — SIGNIFICANT CHANGE UP (ref 3.8–10.5)
WBC # FLD AUTO: 8.7 K/UL — SIGNIFICANT CHANGE UP (ref 3.8–10.5)
WBC UR QL: 0 /HPF — SIGNIFICANT CHANGE UP (ref 0–5)

## 2024-03-09 PROCEDURE — 99284 EMERGENCY DEPT VISIT MOD MDM: CPT

## 2024-03-09 PROCEDURE — 93010 ELECTROCARDIOGRAM REPORT: CPT

## 2024-03-09 RX ORDER — METOCLOPRAMIDE HCL 10 MG
10 TABLET ORAL ONCE
Refills: 0 | Status: COMPLETED | OUTPATIENT
Start: 2024-03-09 | End: 2024-03-09

## 2024-03-09 RX ORDER — ACETAMINOPHEN 500 MG
975 TABLET ORAL ONCE
Refills: 0 | Status: COMPLETED | OUTPATIENT
Start: 2024-03-09 | End: 2024-03-09

## 2024-03-09 RX ORDER — KETOROLAC TROMETHAMINE 30 MG/ML
15 SYRINGE (ML) INJECTION ONCE
Refills: 0 | Status: DISCONTINUED | OUTPATIENT
Start: 2024-03-09 | End: 2024-03-09

## 2024-03-09 RX ORDER — SODIUM CHLORIDE 9 MG/ML
1000 INJECTION INTRAMUSCULAR; INTRAVENOUS; SUBCUTANEOUS ONCE
Refills: 0 | Status: COMPLETED | OUTPATIENT
Start: 2024-03-09 | End: 2024-03-09

## 2024-03-09 RX ADMIN — Medication 15 MILLIGRAM(S): at 20:53

## 2024-03-09 RX ADMIN — Medication 975 MILLIGRAM(S): at 20:53

## 2024-03-09 RX ADMIN — Medication 10 MILLIGRAM(S): at 20:53

## 2024-03-09 RX ADMIN — SODIUM CHLORIDE 1000 MILLILITER(S): 9 INJECTION INTRAMUSCULAR; INTRAVENOUS; SUBCUTANEOUS at 20:58

## 2024-03-09 NOTE — ED ADULT TRIAGE NOTE - CHIEF COMPLAINT QUOTE
c/o headaches, abdominal pain with, nausea, stomach "feeling like there is acid" that started this afternoon.  BP was checked at work reading 198/98. Pt did not take daily BP med Amlodipine and Losartan until after elevated BP reading. Phx HTN, HLD.

## 2024-03-09 NOTE — ED PROVIDER NOTE - RAPID ASSESSMENT
Maxime Frankel DO ([facility] Attending): I performed a medical screening examination and determined this patient to be medically stable and will transfer to the [facility] adult ED for further care. heart and lung exam done and both did not reveal concerns for immediate intervention.  Pt with hypertension, p/w nausea. Denies chest pain, back pain, SOB. Here awake and alert, clear heart and lungs. /83.  Will send to [facility].

## 2024-03-09 NOTE — ED PROVIDER NOTE - TOBACCO USE
Last Appointment:  7/7/2022  Future Appointments   Date Time Provider Yanet Quiroz   1/9/2023  2:20 PM Dagoberto Summers  W [address] [place] [address] Never smoker

## 2024-03-09 NOTE — ED PROVIDER NOTE - ATTENDING CONTRIBUTION TO CARE
Brief HPI:  59-year-old female past medical history of hypertension on losartan, amlodipine presents with abdominal pain, headache.  Patient states earlier today when she was driving to work she felt a cute onset lower abdominal pain.  She states she felt the urge to eat and stopped for soup however could not eat due to nausea and persistent lower abdominal pain.  Symptoms were associated with a dull, nonacute onset, not maximal onset frontal headache.  She went to work where her coworker took her blood pressure which was found to be 200/100.  Patient did not receive any medications prior to arrival to ED.  Upon arrival to ED patient states that all symptoms have resolved and she feels "normal".  No current headache or abdominal pain during my interview.  Patient states that she has had pain in the bilateral lower anterior rib area for many months for which she is followed with her PCP and had negative x-ray.  Patient denies recent travel or sick contacts.  Denies tobacco, alcohol or illicit drug use.  Denies fever, chills, vomiting, chest pain, shortness of breath, leg swelling, visual changes, speech changes, balance problems.    Vitals:   Reviewed    Exam:    GEN:  Non-toxic appearing, non-distressed, speaking full sentences, non-diaphoretic, AAOx3  HEENT:  NCAT, neck supple, EOMI, PERRLA, sclera anicteric, no conjunctival pallor or injection, no stridor, normal voice, no tonsillar exudate, uvula midline  CV:  regular rhythm and rate, s1/s2 audible, no murmurs, rubs or gallops, peripheral pulses 2+ and symmetric  PULM:  non-labored respirations, lungs clear to auscultation bilaterally, no wheezes, crackles or rales  ABD:  non distended, non-tender, no rebound, no guarding, negative Pires's sign, bowel sounds normal, no cvat  MSK:  no gross deformity, non-tender extremities and joints, range of motion grossly normal appearing, no extremity edema, extremities warm and well perfused   NEURO:  AAOx3, CN II-XII intact, motor 5/5 in upper and lower extremities bilaterally, sensation grossly intact in extremities and trunk, finger to nose testing wnl, no nystagmus, negative Romberg, no pronator drift, no gait deficit  SKIN:  warm, dry, no rash or vesicles     A/P:   59-year-old female past medical history of hypertension on losartan, amlodipine presents with abdominal pain, headache, now resolved.  Vital signs stable.  EKG nonischemic.  Patient is asymptomatic during my interview.  Exam unremarkable.  Low concern for hypertensive emergency as patient is asymptomatic with a reassuring blood pressure.  Low concern for acute surgical pathology of abdomen given patient has no tenderness and is not having abdominal pain.  Low concern for subarachnoid hemorrhage as headache nonacute onset, not maximal in onset, no photophobia or neck stiffness.  Will follow labs to assess for endorgan damage.  Patient is requesting discharge.

## 2024-03-09 NOTE — ED PROVIDER NOTE - PATIENT PORTAL LINK FT
You can access the FollowMyHealth Patient Portal offered by [facility] by registering at the following website: [email or web address]. By joining [facility]’s FollowMyHealth portal, you will also be able to view your health information using other applications (apps) compatible with our system.

## 2024-03-09 NOTE — ED PROVIDER NOTE - OBJECTIVE STATEMENT
Patient is a 59-year-old history of hypertension on losartan 50 mg, recently started on amlodipine 5 mg, compliant with medications, presenting with abdominal pain, headache.  Patient states earlier today she started to have lower abdominal pain, nausea, was unable to eat due to nausea, no vomiting.  He went to work, where she was found to have a blood pressure of 200/100.  States she takes her medications, however sometimes not exactly on time.  Has taken them today.  Patient states she then started to get a frontal headache.  Denies blurry vision, vomiting, urinary symptoms, back pain, fevers, chest pain, difficulty breathing.

## 2024-03-09 NOTE — ED PROVIDER NOTE - CLINICAL SUMMARY MEDICAL DECISION MAKING FREE TEXT BOX
Patient is a 59-year-old history of hypertension on losartan 50 mg, recently started on amlodipine 5 mg, compliant with medications, presenting with abdominal pain, headache.  Patient well-appearing abdomen soft.  Tenderness bilateral upper quadrants, however patient states this is due to "knots".  Will get CT, rule out kidney stone versus gallstone versus other intra-abdominal pathology.  Will get labs, lipase, urinalysis.  Will get EKG given elevated blood pressure outpatient, however BP in ED is 160/80 at this time.  Patient would like new primary care doctor, will give follow-up at discharge. Will give fluids, meds, reassess HA, no FND on exam, no severe sudden onset, do not suspect SAH.

## 2024-03-09 NOTE — ED PROVIDER NOTE - CHIEF COMPLAINT
The patient is a 59y Female complaining of  The patient is a 59y Female complaining of abdominal pain

## 2024-03-09 NOTE — ED PROVIDER NOTE - PHYSICAL EXAMINATION
GENERAL: no acute distress, non-toxic appearing  HEAD: normocephalic, atraumatic  HEENT: PERRLA, EOMI, normal conjunctiva, oral mucosa moist  CARDIAC: regular rate and rhythm, no appreciable murmurs  PULM: clear to ascultation bilaterally, no crackles, rales, rhonchi, or wheezing  GI: abdomen nondistended, soft, mild tenderness bilateral upper quadrants, no guarding or rebound tenderness  : no suprapubic tenderness  NEURO: alert and oriented x 3, normal speech, no gross neurologic deficit  MSK: no visible deformities  SKIN: no visible rashes, dry, well-perfused  PSYCH: appropriate mood and affect

## 2024-03-09 NOTE — ED ADULT NURSE NOTE - NSFALLUNIVINTERV_ED_ALL_ED
Bed/Stretcher in lowest position, wheels locked, appropriate side rails in place/Call bell, personal items and telephone in reach/Instruct patient to call for assistance before getting out of bed/chair/stretcher/Non-slip footwear applied when patient is off stretcher/[place] to call system/Physically safe environment - no spills, clutter or unnecessary equipment/Purposeful proactive rounding/Room/bathroom lighting operational, light cord in reach

## 2024-03-09 NOTE — ED PROVIDER NOTE - NSFOLLOWUPCLINICS_GEN_ALL_ED_FT
[facility] Internal [facility]  General Internal Medicine  [address] [place] [address]  Phone: [phone]  Fax:     [facility]  Internal Medicine  [address] [place] [address]  Phone: [phone]  Fax: [phone]    [facility]  Internal Medicine  [address] [place] [address]  Phone: [phone]  Fax: [phone]

## 2024-03-09 NOTE — ED PEDIATRIC TRIAGE NOTE - CHIEF COMPLAINT QUOTE
Pt reporting "felt nauseous and co-worker took BP which was 198/98 BP today at work. +headache. Denies blurry vision. A&Ox4. PMH HTN and High cholesterol

## 2024-03-09 NOTE — ED ADULT NURSE REASSESSMENT NOTE - NS ED NURSE REASSESS COMMENT FT1
Pt verbalized relief from medications given. States does not want to stay for CT. MD Sinha made aware. Pt A&Ox4, ambulatory. Breathing even and unlabored. VSS

## 2024-03-09 NOTE — ED ADULT NURSE NOTE - OBJECTIVE STATEMENT
Pt received in 5A. C/o headache, nausea, and abdominal discomfort this afternoon while on her way to work. States her coworker took her blood pressure and noted it to be elevated to 198/98. Hx of HTN and HLD, reports compliance with medications but does not take them on time. Pt A&Ox4, ambulatory at baseline. Breathing even and unlabored. Pt denies chest pain, SOB, dizziness, blurry vision, neck pain, vomiting, fever, or chills. 20G IV placed on left AC. Labs drawn and sent. Pt medicated per MAR. EKG done in chart. Waiting for CT.

## 2024-03-11 LAB
CULTURE RESULTS: SIGNIFICANT CHANGE UP
SPECIMEN SOURCE: SIGNIFICANT CHANGE UP

## 2024-04-09 DIAGNOSIS — I10 ESSENTIAL (PRIMARY) HYPERTENSION: ICD-10-CM

## 2024-04-09 RX ORDER — AMLODIPINE BESYLATE AND BENAZEPRIL HYDROCHLORIDE 10; 40 MG/1; MG/1
10-40 CAPSULE ORAL
Refills: 0 | Status: ACTIVE | COMMUNITY

## 2024-04-09 RX ORDER — DOCUSATE SODIUM 100 MG/1
100 CAPSULE, LIQUID FILLED ORAL 3 TIMES DAILY
Refills: 0 | Status: ACTIVE | COMMUNITY

## 2024-04-10 ENCOUNTER — APPOINTMENT (OUTPATIENT)
Dept: CARDIOLOGY | Facility: CLINIC | Age: 59
End: 2024-04-10

## 2024-06-26 NOTE — ED PROVIDER NOTE - PROGRESS NOTE DETAILS
Technician: DANI Mendes    Technician Comment:  Good patient effort & cooperation.  The results of this test meet the ATS/ERS standards for acceptability & reproducibility.  Test was performed on the [employer] Plethysmograph-Elite DX system.  Predicted values were GLI-2012 for spirometry, GLI-2020 for DLCO, ITS for Lung Volumes.  The DLCO was uncorrected for Hgb.  A bronchodilator of Albuterol HFA -2puffs via spacer administered.  DLCO performed during dilation period.    Interpretation:  Baseline spirometry shows borderline airflow obstruction with an FEV1/FVC ratio of 70 however FEV1 is preserved at 2.09 L or 108% predicted.  No significant bronchodilator response.  Lung volumes are within normal limits.  Diffusion capacity is within normal limits.  Overall normal pulmonary function testing with normal flow volume loop.   Autumn Sinha MD PGY3: Patient would like to leave without CT. Abdomen soft, non-tender. HA resolving after meds. Patient instructed to return if symptoms come back. Awaiting lab results, will discharge. UA negative. Urine culture sent. /80, patient aware to f/u with a PCP. Will give clinic info.

## 2025-05-05 ENCOUNTER — NON-APPOINTMENT (OUTPATIENT)
Age: 60
End: 2025-05-05